# Patient Record
Sex: MALE | Race: BLACK OR AFRICAN AMERICAN | NOT HISPANIC OR LATINO | Employment: FULL TIME | URBAN - METROPOLITAN AREA
[De-identification: names, ages, dates, MRNs, and addresses within clinical notes are randomized per-mention and may not be internally consistent; named-entity substitution may affect disease eponyms.]

---

## 2022-06-25 ENCOUNTER — HOSPITAL ENCOUNTER (EMERGENCY)
Facility: HOSPITAL | Age: 37
Discharge: HOME | End: 2022-06-25
Attending: FAMILY MEDICINE

## 2022-06-25 VITALS
HEIGHT: 72 IN | HEART RATE: 70 BPM | DIASTOLIC BLOOD PRESSURE: 99 MMHG | BODY MASS INDEX: 35.21 KG/M2 | OXYGEN SATURATION: 99 % | WEIGHT: 260 LBS | TEMPERATURE: 98 F | RESPIRATION RATE: 18 BRPM | SYSTOLIC BLOOD PRESSURE: 161 MMHG

## 2022-06-25 DIAGNOSIS — U07.1 COVID-19: Primary | ICD-10-CM

## 2022-06-25 DIAGNOSIS — U07.1 COVID-19 VIRUS DETECTED: ICD-10-CM

## 2022-06-25 LAB — SARS-COV-2 RDRP RESP QL NAA+PROBE: POSITIVE

## 2022-06-25 PROCEDURE — 99283 EMERGENCY DEPT VISIT LOW MDM: CPT

## 2022-06-25 PROCEDURE — 87635 SARS-COV-2 COVID-19 AMP PRB: CPT | Performed by: NURSE PRACTITIONER

## 2022-06-25 PROCEDURE — 25000003 PHARM REV CODE 250: Performed by: NURSE PRACTITIONER

## 2022-06-25 RX ORDER — IBUPROFEN 400 MG/1
800 TABLET ORAL
Status: COMPLETED | OUTPATIENT
Start: 2022-06-25 | End: 2022-06-25

## 2022-06-25 RX ADMIN — IBUPROFEN 800 MG: 400 TABLET, FILM COATED ORAL at 05:06

## 2022-06-25 NOTE — Clinical Note
"Wade Grewal (Anthony)ee was seen and treated in our emergency department on 6/25/2022.  He may return to work on 07/03/2022.       If you have any questions or concerns, please don't hesitate to call.       RN    "

## 2022-06-25 NOTE — ED PROVIDER NOTES
Encounter Date: 6/25/2022       History     Chief Complaint   Patient presents with    Cough     Pt reports chills, nausea, cough, and headaches onset yesterday      38 y/o male who presents with cough, congestion, chills/sweats, nausea, headache since yesterday.     The history is provided by the patient. No  was used.   Cough  This is a new problem. The current episode started yesterday. The problem occurs constantly. The problem has been unchanged. The cough is non-productive. There has been no fever. The fever has been present for less than 1 day. Associated symptoms include chills, sweats and headaches. He has tried nothing for the symptoms. The treatment provided no relief. He is a smoker.     Review of patient's allergies indicates:  No Known Allergies  History reviewed. No pertinent past medical history.  History reviewed. No pertinent surgical history.  History reviewed. No pertinent family history.  Social History     Tobacco Use    Smoking status: Current Every Day Smoker     Packs/day: 1.00     Types: Cigarettes    Smokeless tobacco: Never Used     Review of Systems   Constitutional: Positive for chills.   HENT: Positive for congestion.    Respiratory: Positive for cough.    Neurological: Positive for headaches.   All other systems reviewed and are negative.      Physical Exam     Initial Vitals [06/25/22 1648]   BP Pulse Resp Temp SpO2   (!) 161/99 70 18 98.1 °F (36.7 °C) 99 %      MAP       --         Physical Exam    Nursing note and vitals reviewed.  Constitutional: He appears well-developed and well-nourished.   HENT:   Head: Atraumatic.   Right Ear: Tympanic membrane and ear canal normal.   Left Ear: Tympanic membrane and ear canal normal.   Mouth/Throat: Oropharynx is clear and moist.   Eyes: Conjunctivae are normal.   Cardiovascular: Normal rate, regular rhythm and normal heart sounds.   Pulmonary/Chest: Breath sounds normal. No respiratory distress.   Musculoskeletal:          General: Normal range of motion.     Neurological: He is alert and oriented to person, place, and time. He has normal strength.   Skin: Skin is warm and dry.   Psychiatric: He has a normal mood and affect.         ED Course   Procedures  Labs Reviewed   SARS-COV-2 RNA AMPLIFICATION, QUAL - Abnormal; Notable for the following components:       Result Value    SARS COV-2 MOLECULAR Positive (*)     All other components within normal limits          Imaging Results    None          Medications   ibuprofen tablet 800 mg (800 mg Oral Given 6/25/22 1730)     Medical Decision Making:   Clinical Tests:   Lab Tests: Ordered and Reviewed    Additional MDM:   Differential Diagnosis:   Other: The following diagnoses were also considered and will be evaluated: covid19, URI.                    Clinical Impression:   Final diagnoses:  [U07.1] COVID-19 (Primary)          ED Disposition Condition    Discharge Stable        ED Prescriptions     None        Follow-up Information     Follow up With Specialties Details Why Contact Info    primary care provider  Call in 1 week As needed, If symptoms worsen            REBECCA Morrow  06/25/22 0517

## 2022-06-25 NOTE — DISCHARGE INSTRUCTIONS
Hydrate with lots of water. Increase vitamin C up to 3000mg per day. Tylenol and ibuprofen for fever/aches. Over the counter medicine to treat symptoms such as claritin, tylenol cold/flu, dayquil/nyquil. Quarantine for days

## 2023-01-09 NOTE — Clinical Note
"Wade Grewal (Anthony)ee was seen and treated in our emergency department on 6/25/2022.  He may return to work on 07/03/2022.       If you have any questions or concerns, please don't hesitate to call.       RN    " Protocol For Nbuvb: The patient received NBUVB. Total Treatment Time: 6:20 minutes Protocol For Bath Puva: The patient received Bath PUVA. Name Of Supervising Technician: MARU Detail Level: Generalized Protocol For Uva1: The patient received UVA1. Protocol For Photochemotherapy For Severe Photoresponsive Dermatoses: Petrolatum And Broad Band Uvb: The patient received Photochemotherapyfor severe photoresponsive dermatoses: Petrolatum and Broad Band UVB requiring at least 4 to 8 hours of care under direct physician supervision. Protocol For Photochemotherapy: Petrolatum And Broad Band Uvb: The patient received Photochemotherapy: Petrolatum and Broad Band UVB. Protocol For Uva: The patient received UVA. Protocol For Photochemotherapy For Severe Photoresponsive Dermatoses: Tar And Nbuvb (Goeckerman Treatment): The patient received Photochemotherapy for severe photoresponsive dermatoses: Tar and NBUVB (Goeckerman treatment) requiring at least 4 to 8 hours of care under direct physician supervision. Protocol For Broad Band Uvb: The patient received Broad Band UVB. Protocol For Photochemotherapy: Tar And Nbuvb (Goeckerman Treatment): The patient received Photochemotherapy: Tar and NBUVB (Goeckerman treatment). Render Post-Care In The Note: no Consent: Written consent obtained.  The risks were reviewed with the patient including but not limited to: burn, pigmentary changes, pain, blistering, scabbing, redness, increased risk of skin cancers, and the remote possibility of scarring. Protocol For Photochemotherapy For Severe Photoresponsive Dermatoses: Puva: The patient received Photochemotherapy for severe photoresponsive dermatoses: PUVA requiring at least 4 to 8 hours of care under direct physician supervision. Protocol For Nb Uva: The patient received NB UVA. Protocol For Photochemotherapy For Severe Photoresponsive Dermatoses: Tar And Broad Band Uvb (Goeckerman Treatment): The patient received Photochemotherapy for severe photoresponsive dermatoses: Tar and Broad Band UVB (Goeckerman treatment) requiring at least 4 to 8 hours of care under direct physician supervision. Protocol For Photochemotherapy: Tar And Broad Band Uvb (Goeckerman Treatment): The patient received Photochemotherapy: Tar and Broad Band UVB (Goeckerman treatment). Protocol For Photochemotherapy: Petrolatum And Nbuvb: The patient received Photochemotherapy: Petrolatum and NBUVB (petrolatum applied to all lesions prior to phototherapy). Protocol For Photochemotherapy: Mineral Oil And Nbuvb: The patient received Photochemotherapy: Mineral Oil and NBUVB (mineral oil applied to all lesions prior to phototherapy). Total Body Time: 1:35 minute Protocol For Puva: The patient received PUVA. Protocol For Photochemotherapy For Severe Photoresponsive Dermatoses: Petrolatum And Nbuvb: The patient received Photochemotherapy for severe photoresponsive dermatoses: Petrolatum and NBUVB requiring at least 4 to 8 hours of care under direct physician supervision. Protocol For Photochemotherapy: Baby Oil And Nbuvb: The patient received Photochemotherapy: Baby Oil and NBUVB (baby oil applied to all lesions prior to phototherapy). Protocol For Protocol For Photochemotherapy For Severe Photoresponsive Dermatoses: Bath Puva: The patient received Photochemotherapy for severe photoresponsive dermatoses: Bath PUVA requiring at least 4 to 8 hours of care under direct physician supervision. Protocol For Photochemotherapy: Mineral Oil And Broad Band Uvb: The patient received Photochemotherapy: Mineral Oil and Broad Band UVB. Protocol: NBUVB Comments On Previous Treatment: Last treatment went well. Irradiance (Optional- Include Units): 1 door open Protocol For Photochemotherapy: Triamcinolone Ointment And Nbuvb: The patient received Photochemotherapy: Triamcinolone and NBUVB (triamcinolone ointment applied to all lesions prior to phototherapy). Post-Care Instructions: I reviewed with the patient in detail post-care instructions. Patient is to wear sun protection. Patients may expect sunburn like redness, discomfort and scabbing. Changes In Treatment Protocol: Increased 20 seconds